# Patient Record
Sex: FEMALE | Race: OTHER | HISPANIC OR LATINO | Employment: FULL TIME | URBAN - METROPOLITAN AREA
[De-identification: names, ages, dates, MRNs, and addresses within clinical notes are randomized per-mention and may not be internally consistent; named-entity substitution may affect disease eponyms.]

---

## 2017-06-26 ENCOUNTER — HOSPITAL ENCOUNTER (EMERGENCY)
Facility: HOSPITAL | Age: 32
Discharge: HOME | End: 2017-06-26
Attending: EMERGENCY MEDICINE
Payer: COMMERCIAL

## 2017-06-26 VITALS
OXYGEN SATURATION: 99 % | SYSTOLIC BLOOD PRESSURE: 126 MMHG | HEART RATE: 62 BPM | DIASTOLIC BLOOD PRESSURE: 71 MMHG | RESPIRATION RATE: 16 BRPM | TEMPERATURE: 98.1 F | WEIGHT: 145 LBS

## 2017-06-26 DIAGNOSIS — R10.9 ABDOMINAL PAIN: Primary | ICD-10-CM

## 2017-06-26 DIAGNOSIS — R11.2 NAUSEA AND VOMITING: ICD-10-CM

## 2017-06-26 LAB
ALBUMIN SERPL BCP-MCNC: 4.2 G/DL (ref 3.5–5)
ALP SERPL-CCNC: 74 U/L (ref 46–116)
ALT SERPL W P-5'-P-CCNC: 65 U/L (ref 12–78)
ANION GAP SERPL CALCULATED.3IONS-SCNC: 6 MMOL/L (ref 4–13)
AST SERPL W P-5'-P-CCNC: 55 U/L (ref 5–45)
BASOPHILS # BLD AUTO: 0.02 THOUSANDS/ΜL (ref 0–0.1)
BASOPHILS NFR BLD AUTO: 0 % (ref 0–1)
BILIRUB SERPL-MCNC: 0.35 MG/DL (ref 0.2–1)
BILIRUB UR QL STRIP: NEGATIVE
BUN SERPL-MCNC: 9 MG/DL (ref 5–25)
CALCIUM SERPL-MCNC: 9.3 MG/DL (ref 8.3–10.1)
CHLORIDE SERPL-SCNC: 100 MMOL/L (ref 100–108)
CLARITY UR: CLEAR
CLARITY, POC: CLEAR
CO2 SERPL-SCNC: 31 MMOL/L (ref 21–32)
COLOR UR: YELLOW
COLOR, POC: YELLOW
CREAT SERPL-MCNC: 0.79 MG/DL (ref 0.6–1.3)
EOSINOPHIL # BLD AUTO: 0.13 THOUSAND/ΜL (ref 0–0.61)
EOSINOPHIL NFR BLD AUTO: 2 % (ref 0–6)
ERYTHROCYTE [DISTWIDTH] IN BLOOD BY AUTOMATED COUNT: 12.5 % (ref 11.6–15.1)
GFR SERPL CREATININE-BSD FRML MDRD: >60 ML/MIN/1.73SQ M
GLUCOSE SERPL-MCNC: 94 MG/DL (ref 65–140)
GLUCOSE UR STRIP-MCNC: NEGATIVE MG/DL
HCG UR QL: NEGATIVE
HCT VFR BLD AUTO: 38.5 % (ref 34.8–46.1)
HGB BLD-MCNC: 13.2 G/DL (ref 11.5–15.4)
HGB UR QL STRIP.AUTO: NEGATIVE
KETONES UR STRIP-MCNC: NEGATIVE MG/DL
LEUKOCYTE ESTERASE UR QL STRIP: NEGATIVE
LIPASE SERPL-CCNC: 101 U/L (ref 73–393)
LYMPHOCYTES # BLD AUTO: 2.36 THOUSANDS/ΜL (ref 0.6–4.47)
LYMPHOCYTES NFR BLD AUTO: 32 % (ref 14–44)
MCH RBC QN AUTO: 30.4 PG (ref 26.8–34.3)
MCHC RBC AUTO-ENTMCNC: 34.3 G/DL (ref 31.4–37.4)
MCV RBC AUTO: 89 FL (ref 82–98)
MONOCYTES # BLD AUTO: 0.54 THOUSAND/ΜL (ref 0.17–1.22)
MONOCYTES NFR BLD AUTO: 7 % (ref 4–12)
NEUTROPHILS # BLD AUTO: 4.28 THOUSANDS/ΜL (ref 1.85–7.62)
NEUTS SEG NFR BLD AUTO: 59 % (ref 43–75)
NITRITE UR QL STRIP: NEGATIVE
NRBC BLD AUTO-RTO: 0 /100 WBCS
PH UR STRIP.AUTO: 6.5 [PH] (ref 4.5–8)
PLATELET # BLD AUTO: 253 THOUSANDS/UL (ref 149–390)
PMV BLD AUTO: 10.5 FL (ref 8.9–12.7)
POTASSIUM SERPL-SCNC: 4.3 MMOL/L (ref 3.5–5.3)
PROT SERPL-MCNC: 8.2 G/DL (ref 6.4–8.2)
PROT UR STRIP-MCNC: NEGATIVE MG/DL
RBC # BLD AUTO: 4.34 MILLION/UL (ref 3.81–5.12)
SODIUM SERPL-SCNC: 137 MMOL/L (ref 136–145)
SP GR UR STRIP.AUTO: 1.02 (ref 1–1.03)
UROBILINOGEN UR QL STRIP.AUTO: 0.2 E.U./DL
WBC # BLD AUTO: 7.33 THOUSAND/UL (ref 4.31–10.16)

## 2017-06-26 PROCEDURE — 96374 THER/PROPH/DIAG INJ IV PUSH: CPT

## 2017-06-26 PROCEDURE — 81003 URINALYSIS AUTO W/O SCOPE: CPT

## 2017-06-26 PROCEDURE — 96361 HYDRATE IV INFUSION ADD-ON: CPT

## 2017-06-26 PROCEDURE — 96375 TX/PRO/DX INJ NEW DRUG ADDON: CPT

## 2017-06-26 PROCEDURE — 85025 COMPLETE CBC W/AUTO DIFF WBC: CPT | Performed by: EMERGENCY MEDICINE

## 2017-06-26 PROCEDURE — 36415 COLL VENOUS BLD VENIPUNCTURE: CPT | Performed by: EMERGENCY MEDICINE

## 2017-06-26 PROCEDURE — 83690 ASSAY OF LIPASE: CPT | Performed by: EMERGENCY MEDICINE

## 2017-06-26 PROCEDURE — 81002 URINALYSIS NONAUTO W/O SCOPE: CPT | Performed by: EMERGENCY MEDICINE

## 2017-06-26 PROCEDURE — 99283 EMERGENCY DEPT VISIT LOW MDM: CPT

## 2017-06-26 PROCEDURE — 80053 COMPREHEN METABOLIC PANEL: CPT | Performed by: EMERGENCY MEDICINE

## 2017-06-26 PROCEDURE — 81025 URINE PREGNANCY TEST: CPT | Performed by: EMERGENCY MEDICINE

## 2017-06-26 RX ORDER — KETOROLAC TROMETHAMINE 30 MG/ML
15 INJECTION, SOLUTION INTRAMUSCULAR; INTRAVENOUS ONCE
Status: COMPLETED | OUTPATIENT
Start: 2017-06-26 | End: 2017-06-26

## 2017-06-26 RX ORDER — FAMOTIDINE 20 MG/1
20 TABLET, FILM COATED ORAL
Qty: 30 TABLET | Refills: 0 | Status: SHIPPED | OUTPATIENT
Start: 2017-06-26

## 2017-06-26 RX ORDER — ONDANSETRON 4 MG/1
4 TABLET, FILM COATED ORAL EVERY 6 HOURS
Qty: 12 TABLET | Refills: 0 | Status: SHIPPED | OUTPATIENT
Start: 2017-06-26

## 2017-06-26 RX ORDER — ONDANSETRON 2 MG/ML
4 INJECTION INTRAMUSCULAR; INTRAVENOUS ONCE
Status: COMPLETED | OUTPATIENT
Start: 2017-06-26 | End: 2017-06-26

## 2017-06-26 RX ADMIN — SODIUM CHLORIDE 1000 ML: 0.9 INJECTION, SOLUTION INTRAVENOUS at 19:56

## 2017-06-26 RX ADMIN — KETOROLAC TROMETHAMINE 15 MG: 30 INJECTION, SOLUTION INTRAMUSCULAR at 19:58

## 2017-06-26 RX ADMIN — ONDANSETRON 4 MG: 2 INJECTION INTRAMUSCULAR; INTRAVENOUS at 19:56

## 2020-11-11 ENCOUNTER — TELEPHONE (OUTPATIENT)
Dept: UROLOGY | Facility: MEDICAL CENTER | Age: 35
End: 2020-11-11

## 2022-05-22 ENCOUNTER — HOSPITAL ENCOUNTER (EMERGENCY)
Facility: HOSPITAL | Age: 37
Discharge: HOME | End: 2022-05-22
Attending: EMERGENCY MEDICINE | Admitting: EMERGENCY MEDICINE
Payer: MEDICARE

## 2022-05-22 VITALS
RESPIRATION RATE: 15 BRPM | TEMPERATURE: 98.1 F | OXYGEN SATURATION: 100 % | HEART RATE: 80 BPM | DIASTOLIC BLOOD PRESSURE: 59 MMHG | SYSTOLIC BLOOD PRESSURE: 110 MMHG

## 2022-05-22 DIAGNOSIS — F41.9 ANXIETY: ICD-10-CM

## 2022-05-22 DIAGNOSIS — R11.2 NAUSEA AND VOMITING: Primary | ICD-10-CM

## 2022-05-22 LAB
ALBUMIN SERPL BCP-MCNC: 4.3 G/DL (ref 3.5–5)
ALP SERPL-CCNC: 61 U/L (ref 46–116)
ALT SERPL W P-5'-P-CCNC: 21 U/L (ref 12–78)
ANION GAP SERPL CALCULATED.3IONS-SCNC: 10 MMOL/L (ref 4–13)
AST SERPL W P-5'-P-CCNC: 23 U/L (ref 5–45)
BACTERIA UR QL AUTO: ABNORMAL /HPF
BASOPHILS # BLD AUTO: 0.04 THOUSANDS/ΜL (ref 0–0.1)
BASOPHILS NFR BLD AUTO: 1 % (ref 0–1)
BILIRUB SERPL-MCNC: 0.27 MG/DL (ref 0.2–1)
BILIRUB UR QL STRIP: NEGATIVE
BUN SERPL-MCNC: 9 MG/DL (ref 5–25)
CALCIUM SERPL-MCNC: 8.9 MG/DL (ref 8.3–10.1)
CHLORIDE SERPL-SCNC: 104 MMOL/L (ref 100–108)
CLARITY UR: ABNORMAL
CO2 SERPL-SCNC: 23 MMOL/L (ref 21–32)
COLOR UR: YELLOW
CREAT SERPL-MCNC: 0.72 MG/DL (ref 0.6–1.3)
EOSINOPHIL # BLD AUTO: 0.06 THOUSAND/ΜL (ref 0–0.61)
EOSINOPHIL NFR BLD AUTO: 1 % (ref 0–6)
ERYTHROCYTE [DISTWIDTH] IN BLOOD BY AUTOMATED COUNT: 11.7 % (ref 11.6–15.1)
EXT PREG TEST URINE: NORMAL
EXT. CONTROL ED NAV: NORMAL
GFR SERPL CREATININE-BSD FRML MDRD: 108 ML/MIN/1.73SQ M
GLUCOSE SERPL-MCNC: 84 MG/DL (ref 65–140)
GLUCOSE UR STRIP-MCNC: NEGATIVE MG/DL
HCT VFR BLD AUTO: 44 % (ref 34.8–46.1)
HGB BLD-MCNC: 14.6 G/DL (ref 11.5–15.4)
HGB UR QL STRIP.AUTO: NEGATIVE
IMM GRANULOCYTES # BLD AUTO: 0.02 THOUSAND/UL (ref 0–0.2)
IMM GRANULOCYTES NFR BLD AUTO: 0 % (ref 0–2)
KETONES UR STRIP-MCNC: NEGATIVE MG/DL
LEUKOCYTE ESTERASE UR QL STRIP: NEGATIVE
LYMPHOCYTES # BLD AUTO: 1.71 THOUSANDS/ΜL (ref 0.6–4.47)
LYMPHOCYTES NFR BLD AUTO: 28 % (ref 14–44)
MCH RBC QN AUTO: 30.4 PG (ref 26.8–34.3)
MCHC RBC AUTO-ENTMCNC: 33.2 G/DL (ref 31.4–37.4)
MCV RBC AUTO: 92 FL (ref 82–98)
MONOCYTES # BLD AUTO: 0.4 THOUSAND/ΜL (ref 0.17–1.22)
MONOCYTES NFR BLD AUTO: 7 % (ref 4–12)
NEUTROPHILS # BLD AUTO: 3.9 THOUSANDS/ΜL (ref 1.85–7.62)
NEUTS SEG NFR BLD AUTO: 63 % (ref 43–75)
NITRITE UR QL STRIP: NEGATIVE
NON-SQ EPI CELLS URNS QL MICRO: ABNORMAL /HPF
NRBC BLD AUTO-RTO: 0 /100 WBCS
PH UR STRIP.AUTO: 8.5 [PH] (ref 4.5–8)
PLATELET # BLD AUTO: 253 THOUSANDS/UL (ref 149–390)
PMV BLD AUTO: 10.6 FL (ref 8.9–12.7)
POTASSIUM SERPL-SCNC: 4.3 MMOL/L (ref 3.5–5.3)
PROT SERPL-MCNC: 8.3 G/DL (ref 6.4–8.2)
PROT UR STRIP-MCNC: ABNORMAL MG/DL
RBC # BLD AUTO: 4.8 MILLION/UL (ref 3.81–5.12)
RBC #/AREA URNS AUTO: ABNORMAL /HPF
SODIUM SERPL-SCNC: 137 MMOL/L (ref 136–145)
SP GR UR STRIP.AUTO: 1.01 (ref 1–1.03)
UROBILINOGEN UR QL STRIP.AUTO: 0.2 E.U./DL
WBC # BLD AUTO: 6.13 THOUSAND/UL (ref 4.31–10.16)
WBC #/AREA URNS AUTO: ABNORMAL /HPF

## 2022-05-22 PROCEDURE — 85025 COMPLETE CBC W/AUTO DIFF WBC: CPT | Performed by: PHYSICIAN ASSISTANT

## 2022-05-22 PROCEDURE — 81001 URINALYSIS AUTO W/SCOPE: CPT

## 2022-05-22 PROCEDURE — 96361 HYDRATE IV INFUSION ADD-ON: CPT

## 2022-05-22 PROCEDURE — 80053 COMPREHEN METABOLIC PANEL: CPT | Performed by: PHYSICIAN ASSISTANT

## 2022-05-22 PROCEDURE — 36415 COLL VENOUS BLD VENIPUNCTURE: CPT | Performed by: PHYSICIAN ASSISTANT

## 2022-05-22 PROCEDURE — 81025 URINE PREGNANCY TEST: CPT | Performed by: PHYSICIAN ASSISTANT

## 2022-05-22 PROCEDURE — 96375 TX/PRO/DX INJ NEW DRUG ADDON: CPT

## 2022-05-22 PROCEDURE — 99284 EMERGENCY DEPT VISIT MOD MDM: CPT | Performed by: PHYSICIAN ASSISTANT

## 2022-05-22 PROCEDURE — 99283 EMERGENCY DEPT VISIT LOW MDM: CPT

## 2022-05-22 PROCEDURE — 96374 THER/PROPH/DIAG INJ IV PUSH: CPT

## 2022-05-22 RX ORDER — FAMOTIDINE 20 MG/1
20 TABLET, FILM COATED ORAL 2 TIMES DAILY
Qty: 14 TABLET | Refills: 0 | Status: SHIPPED | OUTPATIENT
Start: 2022-05-22

## 2022-05-22 RX ORDER — ONDANSETRON 4 MG/1
4 TABLET, ORALLY DISINTEGRATING ORAL EVERY 6 HOURS PRN
Qty: 20 TABLET | Refills: 0 | Status: SHIPPED | OUTPATIENT
Start: 2022-05-22

## 2022-05-22 RX ORDER — LORAZEPAM 2 MG/ML
1 INJECTION INTRAMUSCULAR ONCE
Status: COMPLETED | OUTPATIENT
Start: 2022-05-22 | End: 2022-05-22

## 2022-05-22 RX ORDER — LORAZEPAM 0.5 MG/1
0.5 TABLET ORAL EVERY 6 HOURS PRN
Qty: 8 TABLET | Refills: 0 | Status: SHIPPED | OUTPATIENT
Start: 2022-05-22

## 2022-05-22 RX ORDER — ONDANSETRON 2 MG/ML
4 INJECTION INTRAMUSCULAR; INTRAVENOUS ONCE
Status: COMPLETED | OUTPATIENT
Start: 2022-05-22 | End: 2022-05-22

## 2022-05-22 RX ORDER — KETOROLAC TROMETHAMINE 30 MG/ML
15 INJECTION, SOLUTION INTRAMUSCULAR; INTRAVENOUS ONCE
Status: COMPLETED | OUTPATIENT
Start: 2022-05-22 | End: 2022-05-22

## 2022-05-22 RX ADMIN — ONDANSETRON 4 MG: 2 INJECTION INTRAMUSCULAR; INTRAVENOUS at 10:08

## 2022-05-22 RX ADMIN — KETOROLAC TROMETHAMINE 15 MG: 30 INJECTION, SOLUTION INTRAMUSCULAR; INTRAVENOUS at 10:25

## 2022-05-22 RX ADMIN — LORAZEPAM 1 MG: 2 INJECTION INTRAMUSCULAR; INTRAVENOUS at 10:25

## 2022-05-22 RX ADMIN — SODIUM CHLORIDE 1000 ML: 0.9 INJECTION, SOLUTION INTRAVENOUS at 10:08

## 2022-05-22 NOTE — ED PROVIDER NOTES
History  Chief Complaint   Patient presents with    Nausea     Pt reports that yesterday she had too much to drink, which she does not normally do  Today feels weak  C/o nausea, anxiety  Hx anxiety     Usha Dela Cruz is a 38 yo F presenting with nausea, lightheadedness, fatigue, headache, diarrhea, and some abdominal cramping which began this morning  She reports drinking approximately 6-8 drinks last night and reports she does not normally drink alcohol  She reports this morning she has felt very anxious and has been experiencing tingling in lips and fingertips/toes along with increased respirations due to anxiety  She reports she has xanax at home for anxiety but did not yet take that today  She took tylenol this morning without much relief of headache  Denies illicit substance use  History provided by:  Patient   used: No        Prior to Admission Medications   Prescriptions Last Dose Informant Patient Reported? Taking?   famotidine (PEPCID) 20 mg tablet 5/21/2022 at Unknown time  No Yes   Sig: Take 1 tablet by mouth daily at bedtime as needed for heartburn   ondansetron (ZOFRAN) 4 mg tablet Not Taking at Unknown time  No No   Sig: Take 1 tablet by mouth every 6 (six) hours   Patient not taking: Reported on 5/22/2022      Facility-Administered Medications: None       Past Medical History:   Diagnosis Date    Asthma        History reviewed  No pertinent surgical history  History reviewed  No pertinent family history  I have reviewed and agree with the history as documented  E-Cigarette/Vaping     E-Cigarette/Vaping Substances     Social History     Tobacco Use    Smoking status: Never Smoker    Smokeless tobacco: Never Used   Substance Use Topics    Alcohol use: No    Drug use: Yes     Types: Marijuana     Comment: has medical card       Review of Systems   Constitutional: Positive for fatigue  Negative for chills and fever     HENT: Negative for congestion, rhinorrhea and sore throat  Eyes: Negative for pain and visual disturbance  Respiratory: Negative for cough, shortness of breath and wheezing  Cardiovascular: Negative for chest pain and palpitations  Gastrointestinal: Positive for abdominal pain, diarrhea and nausea  Negative for constipation and vomiting  Genitourinary: Negative for dysuria, frequency and urgency  Musculoskeletal: Negative for back pain, neck pain and neck stiffness  Skin: Negative for rash and wound  Neurological: Positive for light-headedness and headaches  Negative for dizziness, weakness and numbness  Psychiatric/Behavioral: The patient is nervous/anxious  Physical Exam  Physical Exam  Constitutional:       General: She is not in acute distress  Appearance: She is well-developed  She is not diaphoretic  HENT:      Head: Normocephalic and atraumatic  Right Ear: External ear normal       Left Ear: External ear normal    Eyes:      Conjunctiva/sclera: Conjunctivae normal       Pupils: Pupils are equal, round, and reactive to light  Cardiovascular:      Rate and Rhythm: Normal rate and regular rhythm  Heart sounds: Normal heart sounds  No murmur heard  No friction rub  No gallop  Pulmonary:      Effort: Pulmonary effort is normal  No respiratory distress  Breath sounds: Normal breath sounds  No wheezing or rhonchi  Comments: Slight hyperventilation ([id number]) during exam  Abdominal:      General: There is no distension  Palpations: Abdomen is soft  Tenderness: There is no abdominal tenderness  There is no right CVA tenderness, left CVA tenderness or guarding  Musculoskeletal:      Cervical back: Normal range of motion and neck supple  Lymphadenopathy:      Cervical: No cervical adenopathy  Skin:     General: Skin is warm and dry  Capillary Refill: Capillary refill takes less than 2 seconds  Findings: No erythema or rash     Neurological:      Mental Status: She is alert and oriented to person, place, and time  Motor: No abnormal muscle tone  Coordination: Coordination normal    Psychiatric:         Mood and Affect: Mood is anxious  Behavior: Behavior normal          Thought Content:  Thought content normal          Judgment: Judgment normal          Vital Signs  ED Triage Vitals   Temperature Pulse Respirations Blood Pressure SpO2   05/22/22 0917 05/22/22 0917 05/22/22 0917 05/22/22 0917 05/22/22 0917   98 1 °F (36 7 °C) 78 18 118/74 100 %      Temp Source Heart Rate Source Patient Position - Orthostatic VS BP Location FiO2 (%)   05/22/22 0917 05/22/22 0917 -- -- --   Oral Monitor         Pain Score       05/22/22 1007       5           Vitals:    05/22/22 0917 05/22/22 1125   BP: 118/74 110/59   Pulse: 78 80         Visual Acuity      ED Medications  Medications   sodium chloride 0 9 % bolus 1,000 mL (0 mL Intravenous Stopped 5/22/22 1126)   ondansetron (ZOFRAN) injection 4 mg (4 mg Intravenous Given 5/22/22 1008)   ketorolac (TORADOL) injection 15 mg (15 mg Intravenous Given 5/22/22 1025)   LORazepam (ATIVAN) injection 1 mg (1 mg Intravenous Given 5/22/22 1025)       Diagnostic Studies  Results Reviewed     Procedure Component Value Units Date/Time    Urine Microscopic [[id number]]  (Abnormal) Collected: 05/22/22 1021    Lab Status: Final result Specimen: Urine, Clean Catch Updated: 05/22/22 1039     RBC, UA 0-1 /hpf      WBC, UA 0-1 /hpf      Epithelial Cells Occasional /hpf      Bacteria, UA None Seen /hpf     Comprehensive metabolic panel [[id number]]  (Abnormal) Collected: 05/22/22 1006    Lab Status: Final result Specimen: Blood from Arm, Right Updated: 05/22/22 1035     Sodium 137 mmol/L      Potassium 4 3 mmol/L      Chloride 104 mmol/L      CO2 23 mmol/L      ANION GAP 10 mmol/L      BUN 9 mg/dL      Creatinine 0 72 mg/dL      Glucose 84 mg/dL      Calcium 8 9 mg/dL      AST 23 U/L      ALT 21 U/L      Alkaline Phosphatase 61 U/L      Total Protein 8 3 g/dL      Albumin 4 3 g/dL      Total Bilirubin 0 27 mg/dL      eGFR 108 ml/min/1 73sq m     Narrative:      [place] guidelines for Chronic Kidney Disease (CKD):     Stage 1 with normal or high GFR (GFR > 90 mL/min/1 73 square meters)    Stage 2 Mild CKD (GFR = 60-89 mL/min/1 73 square meters)    Stage 3A Moderate CKD (GFR = 45-59 mL/min/1 73 square meters)    Stage 3B Moderate CKD (GFR = 30-44 mL/min/1 73 square meters)    Stage 4 Severe CKD (GFR = 15-29 mL/min/1 73 square meters)    Stage 5 End Stage CKD (GFR <15 mL/min/1 73 square meters)  Note: GFR calculation is accurate only with a steady state creatinine    POCT pregnancy, urine [[id number]]  (Normal) Resulted: 05/22/22 1024    Lab Status: Final result Updated: 05/22/22 1024     EXT PREG TEST UR (Ref: Negative) Negative (-)     Control Valid    Urine Macroscopic, POC [[id number]]  (Abnormal) Collected: 05/22/22 1021    Lab Status: Final result Specimen: Urine Updated: 05/22/22 1022     Color, UA Yellow     Clarity, UA Slightly Cloudy     pH, UA 8 5     Leukocytes, UA Negative     Nitrite, UA Negative     Protein, UA 30 (1+) mg/dl      Glucose, UA Negative mg/dl      Ketones, UA Negative mg/dl      Urobilinogen, UA 0 2 E U /dl      Bilirubin, UA Negative     Blood, UA Negative     Specific Gravity, UA 1 015    Narrative:      CLINITEK RESULT    CBC and differential [[id number]] Collected: 05/22/22 1006    Lab Status: Final result Specimen: Blood from Arm, Right Updated: 05/22/22 1021     WBC 6 13 Thousand/uL      RBC 4 80 Million/uL      Hemoglobin 14 6 g/dL      Hematocrit 44 0 %      MCV 92 fL      MCH 30 4 pg      MCHC 33 2 g/dL      RDW 11 7 %      MPV 10 6 fL      Platelets 447 Thousands/uL      nRBC 0 /100 WBCs      Neutrophils Relative 63 %      Immat GRANS % 0 %      Lymphocytes Relative 28 %      Monocytes Relative 7 %      Eosinophils Relative 1 %      Basophils Relative 1 %      Neutrophils Absolute 3 90 Thousands/µL      Immature Grans Absolute 0 02 Thousand/uL      Lymphocytes Absolute 1 71 Thousands/µL      Monocytes Absolute 0 40 Thousand/µL      Eosinophils Absolute 0 06 Thousand/µL      Basophils Absolute 0 04 Thousands/µL                  No orders to display              Procedures  Procedures         ED Course  ED Course as of 05/22/22 1230   Sun May 22, 2022   1146 Pt reports she has significant improvement in nausea, no further emesis during ED course  She no longer appears anxious and is resting comfortably  Notes anxiety is improved with ativan  She requests ativan at home - informed I cannot provide long term prescription but will provide brief course for the next several days as needed for anxiety  SBIRT 20yo+    Flowsheet Row Most Recent Value   SBIRT (23 yo +)    In order to provide better care to our patients, we are screening all of our patients for alcohol and drug use  Would it be okay to ask you these screening questions? No Filed at: 05/22/2022 5518                    [facility]  Number of Diagnoses or Management Options  Anxiety  Nausea and vomiting  Diagnosis management comments: Symptoms of excess alcohol consumption including nausea, diarrhea, headache, abdominal cramping as well as increased anxiety this morning  Pt appears anxious on exam with slight hyperventilation  She is otherwise well appearing and in no acute distress  Plan for IV fluid hydration, Zofran for nausea, ativan for anxiety  Will check labs including CBC, CMP for electrolytes, urine dip/preg  Disposition pending ED workup          Amount and/or Complexity of Data Reviewed  Clinical lab tests: ordered and reviewed    Patient Progress  Patient progress: improved      Disposition  Final diagnoses:   Nausea and vomiting   Anxiety     Time reflects when diagnosis was documented in both MDM as applicable and the Disposition within this note     Time User Action Codes Description Comment    5/22/2022 11:39 AM Marck Stephen [R11 2] Nausea and vomiting     5/22/2022 11:39 AM Elizabeth Soriano Add [F41 9] Anxiety       ED Disposition     ED Disposition   Discharge    Condition   Stable    Date/Time   Sun May 22, 2022 11:38 AM    Comment   Khalida Ricks discharge to home/self care  Follow-up Information     Follow up With Specialties Details Why [address] [facility] Emergency Department Emergency Medicine  If symptoms worsen [facility] [id number]  [address] [facility] Emergency Department, [address] , Romeo Kelsey MD Family Medicine Schedule an appointment as soon as possible for a visit   [address] [facility]  [phone]             Discharge Medication List as of 5/22/2022 11:43 AM      START taking these medications    Details   !! famotidine (PEPCID) 20 mg tablet Take 1 tablet (20 mg total) by mouth in the morning and 1 tablet (20 mg total) in the evening , Starting Sun 5/22/2022, Normal      LORazepam (Ativan) 0 5 mg tablet Take 1 tablet (0 5 mg total) by mouth every 6 (six) hours as needed for anxiety for up to 8 doses, Starting Sun 5/22/2022, Normal      ondansetron (Zofran ODT) 4 mg disintegrating tablet Take 1 tablet (4 mg total) by mouth every 6 (six) hours as needed for nausea or vomiting, Starting Sun 5/22/2022, Normal       !! - Potential duplicate medications found  Please discuss with provider  CONTINUE these medications which have NOT CHANGED    Details   !! famotidine (PEPCID) 20 mg tablet Take 1 tablet by mouth daily at bedtime as needed for heartburn, Starting Mon 6/26/2017, Print      ondansetron (ZOFRAN) 4 mg tablet Take 1 tablet by mouth every 6 (six) hours, Starting Mon 6/26/2017, Print       !! - Potential duplicate medications found  Please discuss with provider  No discharge procedures on file      PDMP Review     None          ED Provider  Electronically Signed by           Louisa Ayoub PA-C  05/22/22 6385

## 2022-05-22 NOTE — DISCHARGE INSTRUCTIONS
Please refer to the attached information for strict return instructions  If symptoms worsen or new symptoms develop please return to the ER  Drink plenty of fluids to stay hydrated

## 2025-04-30 ENCOUNTER — OFFICE VISIT (OUTPATIENT)
Dept: GASTROENTEROLOGY | Facility: CLINIC | Age: 40
End: 2025-04-30
Payer: COMMERCIAL

## 2025-04-30 VITALS
WEIGHT: 148 LBS | BODY MASS INDEX: 27.94 KG/M2 | TEMPERATURE: 98.1 F | DIASTOLIC BLOOD PRESSURE: 70 MMHG | HEIGHT: 61 IN | SYSTOLIC BLOOD PRESSURE: 120 MMHG

## 2025-04-30 DIAGNOSIS — R10.13 DYSPEPSIA: ICD-10-CM

## 2025-04-30 DIAGNOSIS — K59.04 CHRONIC IDIOPATHIC CONSTIPATION: Primary | ICD-10-CM

## 2025-04-30 DIAGNOSIS — R27.8 DYSSYNERGIA: ICD-10-CM

## 2025-04-30 DIAGNOSIS — N81.6 RECTOCELE: ICD-10-CM

## 2025-04-30 DIAGNOSIS — K59.04 CHRONIC IDIOPATHIC CONSTIPATION: ICD-10-CM

## 2025-04-30 DIAGNOSIS — E73.9 LACTOSE INTOLERANCE: ICD-10-CM

## 2025-04-30 PROCEDURE — 99244 OFF/OP CNSLTJ NEW/EST MOD 40: CPT | Performed by: INTERNAL MEDICINE

## 2025-04-30 RX ORDER — BISACODYL 5 MG/1
10 TABLET, DELAYED RELEASE ORAL ONCE
Qty: 4 TABLET | Refills: 0 | Status: SHIPPED | OUTPATIENT
Start: 2025-04-30 | End: 2025-04-30

## 2025-04-30 RX ORDER — MONTELUKAST SODIUM 10 MG/1
10 TABLET ORAL
COMMUNITY
Start: 2025-01-27 | End: 2026-01-27

## 2025-04-30 RX ORDER — SODIUM CHLORIDE, SODIUM LACTATE, POTASSIUM CHLORIDE, CALCIUM CHLORIDE 600; 310; 30; 20 MG/100ML; MG/100ML; MG/100ML; MG/100ML
125 INJECTION, SOLUTION INTRAVENOUS CONTINUOUS
OUTPATIENT
Start: 2025-04-30

## 2025-04-30 RX ORDER — MIRTAZAPINE 7.5 MG/1
7.5 TABLET, FILM COATED ORAL
COMMUNITY
Start: 2025-02-03

## 2025-04-30 RX ORDER — AMOXICILLIN 250 MG
1 CAPSULE ORAL DAILY
COMMUNITY
Start: 2025-04-22 | End: 2026-04-22

## 2025-04-30 RX ORDER — CITALOPRAM HYDROBROMIDE 40 MG/1
40 TABLET ORAL DAILY
COMMUNITY
Start: 2025-04-17 | End: 2026-04-17

## 2025-04-30 RX ORDER — ALPRAZOLAM 0.5 MG
0.5 TABLET ORAL 3 TIMES DAILY PRN
COMMUNITY
Start: 2025-04-17 | End: 2025-10-14

## 2025-04-30 RX ORDER — CETIRIZINE HYDROCHLORIDE 10 MG/1
1 TABLET ORAL 2 TIMES DAILY
COMMUNITY
Start: 2025-04-17

## 2025-04-30 RX ORDER — ALBUTEROL SULFATE 90 UG/1
INHALANT RESPIRATORY (INHALATION)
COMMUNITY

## 2025-04-30 RX ORDER — PROPRANOLOL HYDROCHLORIDE 60 MG/1
60 TABLET ORAL DAILY
COMMUNITY
Start: 2024-11-29 | End: 2025-11-29

## 2025-04-30 RX ORDER — RIZATRIPTAN BENZOATE 5 MG/1
5 TABLET ORAL
COMMUNITY
Start: 2024-11-29 | End: 2025-11-29

## 2025-04-30 RX ORDER — BUDESONIDE AND FORMOTEROL FUMARATE DIHYDRATE 160; 4.5 UG/1; UG/1
2 AEROSOL RESPIRATORY (INHALATION) 2 TIMES DAILY
COMMUNITY
Start: 2025-01-27

## 2025-04-30 NOTE — ASSESSMENT & PLAN NOTE
Orders:    Celiac Disease Comprehensive Panel; Future    TSH, 3rd generation with Free T4 reflex; Future    polyethylene glycol (GOLYTELY) 4000 mL solution; Take 4,000 mL by mouth once for 1 dose    polyethylene glycol (GOLYTELY) 4000 mL solution; Take 4,000 mL by mouth once for 1 dose    bisacodyl (DULCOLAX) 5 mg EC tablet; Take 2 tablets (10 mg total) by mouth once for 1 dose    Ambulatory Referral to Physical Therapy; Future    linaCLOtide 290 MCG CAPS; Take 1 capsule by mouth daily  - Symptoms suggest IBS-C and potential pelvic floor dysfunction in the rectal region.  - Previous imaging studies reveal extensive stool accumulation throughout the colon.  - Enhance symptom management and increase frequency of complete evacuation.  - Discussed potential benefits of biofeedback and pelvic physical therapy for constipation.  - Reassured condition is likely functional rather than malignant.  - Initiate two-day preparation for colonoscopy.  - Discontinue Linzess 48 hours prior to defecography and anal manometry.  - Order defecography, anal manometry, and colonoscopy with two-day preparation.  - Prescribe Linzess 290 mcg, to be taken on an empty stomach with MiraLAX and Dulcolax.  - Referral for pelvic physical therapy.

## 2025-04-30 NOTE — PATIENT INSTRUCTIONS
Scheduled date of EGD/colonoscopy (as of today):06.19.25  Physician performing EGD/colonoscopy:DR PEREZ  Location of EGD[facility]colonoscopy[facility]  Desired bowel prep reviewed with patient:2 DAY GOLKEVON  Instructions reviewed with patient by:AMANUEL  Clearances: N/A    ANAL MANOMETRY scheduled 05/09/25  Pt will schedule MRI

## 2025-04-30 NOTE — ASSESSMENT & PLAN NOTE
As above  Orders:    MRI defecography; Future    Basic metabolic panel; Future    Colonoscopy; Future    Anal manometry; Future    Ambulatory Referral to Physical Therapy; Future

## 2025-04-30 NOTE — ASSESSMENT & PLAN NOTE
May need referral to colorectal depending on size of rectocele this was reported by patient.  I suspect pelvic floor may be a major issue in this situation especially with bladder prolapse history requiring surgery.

## 2025-04-30 NOTE — H&P (VIEW-ONLY)
Name: Humaira Flynn      : 1985      MRN: [id number]  Encounter Provider: Jian Bailey MD  Encounter Date: 2025   Encounter department: [facility] [place]  :  Assessment & Plan  Chronic idiopathic constipation    Orders:    Celiac Disease Comprehensive Panel; Future    TSH, 3rd generation with Free T4 reflex; Future    polyethylene glycol (GOLYTELY) 4000 mL solution; Take 4,000 mL by mouth once for 1 dose    polyethylene glycol (GOLYTELY) 4000 mL solution; Take 4,000 mL by mouth once for 1 dose    bisacodyl (DULCOLAX) 5 mg EC tablet; Take 2 tablets (10 mg total) by mouth once for 1 dose    Ambulatory Referral to Physical Therapy; Future    linaCLOtide 290 MCG CAPS; Take 1 capsule by mouth daily  - Symptoms suggest IBS-C and potential pelvic floor dysfunction in the rectal region.  - Previous imaging studies reveal extensive stool accumulation throughout the colon.  - Enhance symptom management and increase frequency of complete evacuation.  - Discussed potential benefits of biofeedback and pelvic physical therapy for constipation.  - Reassured condition is likely functional rather than malignant.  - Initiate two-day preparation for colonoscopy.  - Discontinue Linzess 48 hours prior to defecography and anal manometry.  - Order defecography, anal manometry, and colonoscopy with two-day preparation.  - Prescribe Linzess 290 mcg, to be taken on an empty stomach with MiraLAX and Dulcolax.  - Referral for pelvic physical therapy.  Dyspepsia  Likely in setting of constipation will rule out other secondary causes  We will also plan for EGD and colonoscopy simultaneously  - Significant bloating may be secondary to constipation.  - Recommend low FODMAP diet to manage symptoms.  - Perform endoscopy concurrently with colonoscopy to rule out upper GI tract issues.  - Conduct tests for celiac disease and thyroid function.  Has history of lactose intolerance continue to avoid  dairy  Orders:    EGD; Future    Dyssynergia  As above  Orders:    MRI defecography; Future    Basic metabolic panel; Future    Colonoscopy; Future    Anal manometry; Future    Ambulatory Referral to Physical Therapy; Future    Rectocele  May need referral to colorectal depending on size of rectocele this was reported by patient.  I suspect pelvic floor may be a major issue in this situation especially with bladder prolapse history requiring surgery.       Lactose intolerance  Continue lactose-free diet           History of Present Illness     History of Present Illness  The patient is a 39-year-old female who presents for constipation and abdominal pain, complicated by blood in the stool.    She has been experiencing abdominal pain and discomfort for several years. A colonoscopy performed at [facility] in 2020 did not reveal any significant findings, leading to a tentative diagnosis of irritable bowel syndrome with constipation (IBS-C). Constipation has been managed with MiraLAX twice daily for over 12 to 15 years, but she reports diminishing effectiveness. Despite these interventions, persistent constipation is characterized by infrequent bowel movements every 7 to 10 days, often requiring the use of enemas or suppositories. Stools are typically thin, ribbon-like, pale, and rigid, occasionally accompanied by blood. She rarely experiences the urge to defecate and cannot recall her last normal bowel movement. Various treatments have been attempted, including Colace, Smooth Move tea, Mag O7, Metamucil, and Dulcolax. Linzess was prescribed following her colonoscopy, but it was found ineffective. During a follow-up appointment, she was informed that her symptoms might be due to pelvic floor issues.    A history of pelvic floor issues includes the placement of a mesh to support her bladder in 2020, which was removed a year later due to complications, leading to a recurrence of her symptoms. Her OB/GYN recently  "diagnosed her with a mild rectocele. Worsening constipation has led to the use of glycerin suppositories, large amounts of water, and incorporating dates and prunes into her diet. Some relief has been found using a squatting potty. A long-standing history of constipation dates back to childhood, progressively worsening over the years. She has three children, all delivered vaginally without episiotomy. An appendectomy was performed a few years ago.    Bloating and discomfort are reported after eating, even small amounts of food. Cheese and milk are avoided due to lactose intolerance, and lactose-free milk is consumed instead. An endoscopy has not been performed.    She is currently on antidepressants and does not take any pain medications.    PAST SURGICAL HISTORY: Appendectomy in 2016, mesh placement for bladder support in 2020, and subsequent mesh removal in 2021.    SOCIAL HISTORY  Diet: Eats dates and prunes, drinks a lot of water, avoids cheese and milk, drinks lactose-free milk.      Review of Systems   Constitutional: Negative.    HENT: Negative.     Eyes: Negative.    Respiratory: Negative.     Cardiovascular: Negative.    Gastrointestinal:  Positive for abdominal distention, abdominal pain, blood in stool and constipation. Negative for anal bleeding, diarrhea, nausea, rectal pain and vomiting.   Endocrine: Negative.    Musculoskeletal: Negative.    Skin: Negative.    Allergic/Immunologic: Negative.    Neurological: Negative.    Hematological: Negative.    Psychiatric/Behavioral: Negative.      A complete review of systems is negative other than that noted above in the HPI.         Objective   /70 (BP Location: Right arm, Patient Position: Sitting, Cuff Size: Standard)   Temp 98.1 °F (36.7 °C) (Tympanic Core)   Ht 5' 1\" (1.549 m)   Wt 67.1 kg (148 lb)   BMI 27.96 kg/m²     Physical Exam  HENT:      Head: Normocephalic and atraumatic.   Cardiovascular:      Rate and Rhythm: Normal rate and regular " rhythm.   Pulmonary:      Effort: Pulmonary effort is normal.      Breath sounds: Normal breath sounds.   Abdominal:      General: Bowel sounds are normal. There is no distension.      Palpations: Abdomen is soft.      Tenderness: There is generalized abdominal tenderness. There is no rebound.   Musculoskeletal:      Cervical back: Normal range of motion.   Skin:     General: Skin is warm.   Neurological:      Mental Status: She is oriented to person, place, and time.       Physical Exam  Heart: Cardiovascular exam within normal limits.  Lungs: Respiratory exam within normal limits.  Abdomen: Bowel sounds are scanty. The abdomen is tympanic on palpation, otherwise soft.    Results  Labs   - Complete Blood Count (CBC): Within normal limits   - Comprehensive Metabolic Panel (CMP): 2024, Within normal limits    Imaging   - CT scan: 2016, Nondilated bowel, prior appendectomy, formed stool in the colon rectum, no free fluid or air  Lab Results: I personally reviewed relevant lab results.

## 2025-04-30 NOTE — ASSESSMENT & PLAN NOTE
Likely in setting of constipation will rule out other secondary causes  We will also plan for EGD and colonoscopy simultaneously  - Significant bloating may be secondary to constipation.  - Recommend low FODMAP diet to manage symptoms.  - Perform endoscopy concurrently with colonoscopy to rule out upper GI tract issues.  - Conduct tests for celiac disease and thyroid function.  Has history of lactose intolerance continue to avoid dairy  Orders:    EGD; Future

## 2025-04-30 NOTE — PROGRESS NOTES
Name: Humaira Flynn      : 1985      MRN: [id number]  Encounter Provider: Jian Bailey MD  Encounter Date: 2025   Encounter department: [facility] [place]  :  Assessment & Plan  Chronic idiopathic constipation    Orders:    Celiac Disease Comprehensive Panel; Future    TSH, 3rd generation with Free T4 reflex; Future    polyethylene glycol (GOLYTELY) 4000 mL solution; Take 4,000 mL by mouth once for 1 dose    polyethylene glycol (GOLYTELY) 4000 mL solution; Take 4,000 mL by mouth once for 1 dose    bisacodyl (DULCOLAX) 5 mg EC tablet; Take 2 tablets (10 mg total) by mouth once for 1 dose    Ambulatory Referral to Physical Therapy; Future    linaCLOtide 290 MCG CAPS; Take 1 capsule by mouth daily  - Symptoms suggest IBS-C and potential pelvic floor dysfunction in the rectal region.  - Previous imaging studies reveal extensive stool accumulation throughout the colon.  - Enhance symptom management and increase frequency of complete evacuation.  - Discussed potential benefits of biofeedback and pelvic physical therapy for constipation.  - Reassured condition is likely functional rather than malignant.  - Initiate two-day preparation for colonoscopy.  - Discontinue Linzess 48 hours prior to defecography and anal manometry.  - Order defecography, anal manometry, and colonoscopy with two-day preparation.  - Prescribe Linzess 290 mcg, to be taken on an empty stomach with MiraLAX and Dulcolax.  - Referral for pelvic physical therapy.  Dyspepsia  Likely in setting of constipation will rule out other secondary causes  We will also plan for EGD and colonoscopy simultaneously  - Significant bloating may be secondary to constipation.  - Recommend low FODMAP diet to manage symptoms.  - Perform endoscopy concurrently with colonoscopy to rule out upper GI tract issues.  - Conduct tests for celiac disease and thyroid function.  Has history of lactose intolerance continue to avoid  dairy  Orders:    EGD; Future    Dyssynergia  As above  Orders:    MRI defecography; Future    Basic metabolic panel; Future    Colonoscopy; Future    Anal manometry; Future    Ambulatory Referral to Physical Therapy; Future    Rectocele  May need referral to colorectal depending on size of rectocele this was reported by patient.  I suspect pelvic floor may be a major issue in this situation especially with bladder prolapse history requiring surgery.       Lactose intolerance  Continue lactose-free diet           History of Present Illness     History of Present Illness  The patient is a 39-year-old female who presents for constipation and abdominal pain, complicated by blood in the stool.    She has been experiencing abdominal pain and discomfort for several years. A colonoscopy performed at [facility] in 2020 did not reveal any significant findings, leading to a tentative diagnosis of irritable bowel syndrome with constipation (IBS-C). Constipation has been managed with MiraLAX twice daily for over 12 to 15 years, but she reports diminishing effectiveness. Despite these interventions, persistent constipation is characterized by infrequent bowel movements every 7 to 10 days, often requiring the use of enemas or suppositories. Stools are typically thin, ribbon-like, pale, and rigid, occasionally accompanied by blood. She rarely experiences the urge to defecate and cannot recall her last normal bowel movement. Various treatments have been attempted, including Colace, Smooth Move tea, Mag O7, Metamucil, and Dulcolax. Linzess was prescribed following her colonoscopy, but it was found ineffective. During a follow-up appointment, she was informed that her symptoms might be due to pelvic floor issues.    A history of pelvic floor issues includes the placement of a mesh to support her bladder in 2020, which was removed a year later due to complications, leading to a recurrence of her symptoms. Her OB/GYN recently  "diagnosed her with a mild rectocele. Worsening constipation has led to the use of glycerin suppositories, large amounts of water, and incorporating dates and prunes into her diet. Some relief has been found using a squatting potty. A long-standing history of constipation dates back to childhood, progressively worsening over the years. She has three children, all delivered vaginally without episiotomy. An appendectomy was performed a few years ago.    Bloating and discomfort are reported after eating, even small amounts of food. Cheese and milk are avoided due to lactose intolerance, and lactose-free milk is consumed instead. An endoscopy has not been performed.    She is currently on antidepressants and does not take any pain medications.    PAST SURGICAL HISTORY: Appendectomy in 2016, mesh placement for bladder support in 2020, and subsequent mesh removal in 2021.    SOCIAL HISTORY  Diet: Eats dates and prunes, drinks a lot of water, avoids cheese and milk, drinks lactose-free milk.      Review of Systems   Constitutional: Negative.    HENT: Negative.     Eyes: Negative.    Respiratory: Negative.     Cardiovascular: Negative.    Gastrointestinal:  Positive for abdominal distention, abdominal pain, blood in stool and constipation. Negative for anal bleeding, diarrhea, nausea, rectal pain and vomiting.   Endocrine: Negative.    Musculoskeletal: Negative.    Skin: Negative.    Allergic/Immunologic: Negative.    Neurological: Negative.    Hematological: Negative.    Psychiatric/Behavioral: Negative.      A complete review of systems is negative other than that noted above in the HPI.         Objective   /70 (BP Location: Right arm, Patient Position: Sitting, Cuff Size: Standard)   Temp 98.1 °F (36.7 °C) (Tympanic Core)   Ht 5' 1\" (1.549 m)   Wt 67.1 kg (148 lb)   BMI 27.96 kg/m²     Physical Exam  HENT:      Head: Normocephalic and atraumatic.   Cardiovascular:      Rate and Rhythm: Normal rate and regular " rhythm.   Pulmonary:      Effort: Pulmonary effort is normal.      Breath sounds: Normal breath sounds.   Abdominal:      General: Bowel sounds are normal. There is no distension.      Palpations: Abdomen is soft.      Tenderness: There is generalized abdominal tenderness. There is no rebound.   Musculoskeletal:      Cervical back: Normal range of motion.   Skin:     General: Skin is warm.   Neurological:      Mental Status: She is oriented to person, place, and time.       Physical Exam  Heart: Cardiovascular exam within normal limits.  Lungs: Respiratory exam within normal limits.  Abdomen: Bowel sounds are scanty. The abdomen is tympanic on palpation, otherwise soft.    Results  Labs   - Complete Blood Count (CBC): Within normal limits   - Comprehensive Metabolic Panel (CMP): 2024, Within normal limits    Imaging   - CT scan: 2016, Nondilated bowel, prior appendectomy, formed stool in the colon rectum, no free fluid or air  Lab Results: I personally reviewed relevant lab results.

## 2025-04-30 NOTE — TELEPHONE ENCOUNTER
Reason for call:   [x] Refill   [] Prior Auth  [] Other:     Office:   [] PCP/Provider -   [x] Specialty/Provider - gastro    Medication: polyethylene glycol (GOLYTELY) 4000 mL solution Take 4,000 mL by mouth once for 1 dose,       Pharmacy: [facility] #[id number] - [place] - [address]      [facility]   Does the patient have enough for 3 days?   [x] Yes   [] No - Send as HP to POD    Mail Away Pharmacy   Does the patient have enough for 10 days?   [] Yes   [] No - Send as HP to POD

## 2025-05-01 ENCOUNTER — TELEPHONE (OUTPATIENT)
Dept: GASTROENTEROLOGY | Facility: HOSPITAL | Age: 40
End: 2025-05-01

## 2025-05-08 ENCOUNTER — TELEPHONE (OUTPATIENT)
Dept: GASTROENTEROLOGY | Facility: HOSPITAL | Age: 40
End: 2025-05-08

## 2025-05-14 ENCOUNTER — TRANSCRIBE ORDERS (OUTPATIENT)
Dept: GASTROENTEROLOGY | Facility: CLINIC | Age: 40
End: 2025-05-14

## 2025-05-14 ENCOUNTER — HOSPITAL ENCOUNTER (OUTPATIENT)
Dept: MRI IMAGING | Facility: HOSPITAL | Age: 40
Discharge: HOME | End: 2025-05-14
Attending: INTERNAL MEDICINE
Payer: COMMERCIAL

## 2025-05-14 DIAGNOSIS — R27.8 DYSSYNERGIA: ICD-10-CM

## 2025-05-14 PROCEDURE — 72195 MRI PELVIS W/O DYE: CPT

## 2025-05-20 DIAGNOSIS — N81.6 RECTOCELE: ICD-10-CM

## 2025-05-20 DIAGNOSIS — K59.04 CHRONIC IDIOPATHIC CONSTIPATION: ICD-10-CM

## 2025-05-20 DIAGNOSIS — R27.8 DYSSYNERGIA: Primary | ICD-10-CM

## 2025-05-20 NOTE — TELEPHONE ENCOUNTER
Patients GI provider:  Dr. Bailey    Number to return call: [phone]    Reason for call: A patient called in to see if Dr. Bailey has any sooner appointments available for an EGD/Colonoscopy. As of now, the earliest he has is the scheduled date of 06/19/25. She mentioned that he would call her if there is a way to get her scheduled sooner; however, if not, would he suggest that she schedule with a different provider in the office who has availability? She would like a call back to advise on this.      Scheduled procedure/appointment date if applicable: Procedure: 06/19/25

## 2025-05-20 NOTE — TELEPHONE ENCOUNTER
Spoke with pt.  No sooner appt with Dr. Bailey is currently available.  I will keep an eye on his schedule and let her know if procedure appts come available.

## 2025-05-20 NOTE — TELEPHONE ENCOUNTER
Pt calling to r/s procedure for an earlier date, she is scheduled for the first avail with Dr. Bailey.  Left procedure scheduled as is.

## 2025-05-21 ENCOUNTER — TELEPHONE (OUTPATIENT)
Dept: GASTROENTEROLOGY | Facility: CLINIC | Age: 40
End: 2025-05-21

## 2025-05-23 NOTE — PROGRESS NOTES
Name: Humaira Flynn      : 1985      MRN: [id number]  Encounter Provider: Venita Willett MD  Encounter Date: 2025   Encounter department: [facility] [place]  :  Assessment & Plan           History of Present Illness {?Quick Links Encounters * My Last Note * Last Note in Specialty * Snapshot * Since Last Visit * History :55344}  HPI    Humaira Flynn presents referred by Dr. Jian Bailey for dyssynergia; chronic idiopathic constipation; rectocele.     Defecography on 2025: 1.  Anatomic findings: Normal.  2.  Anterior compartment findings: Mild cystocele with urethral hypermobility. 3.  Middle compartment findings: No significant uterine prolapse. 4.  Levator hiatus and anorectal junction/perineal descent findings: Slightly widened levator hiatus and low-lying anorectal junction at rest with excessive widening and excessive descent of the anorectal junction/perineum during defecation/maximal Valsalva. 5.  Posterior compartment findings: Moderate anterior rectocele. Paradoxical narrowing of the anorectal angle during attempted defecation.    Future Tests:  Anal manometry scheduled for 2025.   Colonoscopy/EGD scheduled for 2025 with Dr. Bailey.     Last colonoscopy performed on 3/5/2020 at [facility].     Review of Systems    Objective {?Quick Links Trend Vitals * Enter New Vitals * Results Review * Timeline (Adult) * Labs * Imaging * Cardiology * Procedures * Lung Cancer Screening * Surgical eConsent :06308}    There were no vitals taken for this visit.     Physical Exam  {Administrative / Billing Section (Optional):45721}

## 2025-05-27 ENCOUNTER — CONSULT (OUTPATIENT)
Age: 40
End: 2025-05-27
Attending: INTERNAL MEDICINE
Payer: COMMERCIAL

## 2025-05-27 VITALS — HEIGHT: 61 IN | BODY MASS INDEX: 27.94 KG/M2 | WEIGHT: 148 LBS

## 2025-05-27 DIAGNOSIS — N81.6 RECTOCELE: ICD-10-CM

## 2025-05-27 DIAGNOSIS — R27.8 DYSSYNERGIA: Primary | ICD-10-CM

## 2025-05-27 DIAGNOSIS — K59.04 CHRONIC IDIOPATHIC CONSTIPATION: ICD-10-CM

## 2025-05-27 PROCEDURE — 46600 DIAGNOSTIC ANOSCOPY SPX: CPT | Performed by: SURGERY

## 2025-05-27 PROCEDURE — 99203 OFFICE O/P NEW LOW 30 MIN: CPT | Performed by: SURGERY

## 2025-05-29 ENCOUNTER — ANESTHESIA (OUTPATIENT)
Dept: GASTROENTEROLOGY | Facility: HOSPITAL | Age: 40
End: 2025-05-29
Payer: COMMERCIAL

## 2025-05-29 ENCOUNTER — ANESTHESIA EVENT (OUTPATIENT)
Dept: GASTROENTEROLOGY | Facility: HOSPITAL | Age: 40
End: 2025-05-29
Payer: COMMERCIAL

## 2025-05-29 ENCOUNTER — HOSPITAL ENCOUNTER (OUTPATIENT)
Dept: GASTROENTEROLOGY | Facility: HOSPITAL | Age: 40
Setting detail: OUTPATIENT SURGERY
Discharge: HOME | End: 2025-05-29
Attending: INTERNAL MEDICINE
Payer: COMMERCIAL

## 2025-05-29 VITALS
HEART RATE: 48 BPM | SYSTOLIC BLOOD PRESSURE: 104 MMHG | HEIGHT: 61 IN | BODY MASS INDEX: 27.94 KG/M2 | RESPIRATION RATE: 18 BRPM | WEIGHT: 148 LBS | DIASTOLIC BLOOD PRESSURE: 58 MMHG | OXYGEN SATURATION: 100 % | TEMPERATURE: 97.1 F

## 2025-05-29 DIAGNOSIS — R10.13 DYSPEPSIA: ICD-10-CM

## 2025-05-29 DIAGNOSIS — R27.8 DYSSYNERGIA: ICD-10-CM

## 2025-05-29 PROBLEM — J45.909 ASTHMA: Status: ACTIVE | Noted: 2025-05-29

## 2025-05-29 PROCEDURE — 45378 DIAGNOSTIC COLONOSCOPY: CPT | Performed by: INTERNAL MEDICINE

## 2025-05-29 PROCEDURE — 88305 TISSUE EXAM BY PATHOLOGIST: CPT | Performed by: SPECIALIST

## 2025-05-29 PROCEDURE — 43239 EGD BIOPSY SINGLE/MULTIPLE: CPT | Performed by: INTERNAL MEDICINE

## 2025-05-29 PROCEDURE — 88342 IMHCHEM/IMCYTCHM 1ST ANTB: CPT | Performed by: SPECIALIST

## 2025-05-29 RX ORDER — LIDOCAINE HYDROCHLORIDE 20 MG/ML
INJECTION, SOLUTION EPIDURAL; INFILTRATION; INTRACAUDAL; PERINEURAL AS NEEDED
Status: DISCONTINUED | OUTPATIENT
Start: 2025-05-29 | End: 2025-05-29

## 2025-05-29 RX ORDER — PROPOFOL 10 MG/ML
INJECTION, EMULSION INTRAVENOUS AS NEEDED
Status: DISCONTINUED | OUTPATIENT
Start: 2025-05-29 | End: 2025-05-29

## 2025-05-29 RX ORDER — SODIUM CHLORIDE 9 MG/ML
INJECTION, SOLUTION INTRAVENOUS CONTINUOUS PRN
Status: DISCONTINUED | OUTPATIENT
Start: 2025-05-29 | End: 2025-05-29

## 2025-05-29 RX ORDER — PHENYLEPHRINE HCL IN 0.9% NACL 1 MG/10 ML
SYRINGE (ML) INTRAVENOUS AS NEEDED
Status: DISCONTINUED | OUTPATIENT
Start: 2025-05-29 | End: 2025-05-29

## 2025-05-29 RX ADMIN — PROPOFOL 100 MG: 10 INJECTION, EMULSION INTRAVENOUS at 09:57

## 2025-05-29 RX ADMIN — SODIUM CHLORIDE: 0.9 INJECTION, SOLUTION INTRAVENOUS at 09:50

## 2025-05-29 RX ADMIN — PROPOFOL 120 MCG/KG/MIN: 10 INJECTION, EMULSION INTRAVENOUS at 09:58

## 2025-05-29 RX ADMIN — LIDOCAINE HYDROCHLORIDE 100 MG: 20 INJECTION, SOLUTION EPIDURAL; INFILTRATION; INTRACAUDAL; PERINEURAL at 09:57

## 2025-05-29 RX ADMIN — Medication 100 MCG: at 09:59

## 2025-05-29 NOTE — INTERVAL H&P NOTE
H&P reviewed. After examining the patient I find no changes in the patients condition since the H&P had been written.    Vitals:    05/29/25 0857   BP: 106/63   Pulse: (!) 51   Resp: 16   Temp: (!) 97.1 °F (36.2 °C)   SpO2: 100%

## 2025-05-29 NOTE — ANESTHESIA PREPROCEDURE EVALUATION
Procedure:  COLONOSCOPY  EGD    Relevant Problems   ANESTHESIA   (-) History of anesthesia complications      CARDIO   (+) Migraine without aura and without status migrainosus, not intractable   (-) Chest pain   (-) AREVALO (dyspnea on exertion)      NEURO/PSYCH   (+) Generalized anxiety disorder   (+) Migraine without aura and without status migrainosus, not intractable   (+) Moderate episode of recurrent major depressive disorder (HCC)      PULMONARY   (+) Asthma   (-) Shortness of breath   (-) Sleep apnea   (-) URI (upper respiratory infection)        Physical Exam    Airway     Mallampati score: II  TM Distance: >3 FB  Neck ROM: full      Cardiovascular      Dental       Pulmonary      Neurological      Other Findings  post-pubertal.      Anesthesia Plan  ASA Score- 2     Anesthesia Type- IV sedation with anesthesia with ASA Monitors.         Additional Monitors:     Airway Plan: natural airway.           Plan Factors-Exercise tolerance (METS): >4 METS.    Chart reviewed. EKG reviewed.  Existing labs reviewed. Patient summary reviewed.                  Induction- intravenous.    Postoperative Plan- .   Monitoring Plan - Monitoring plan - standard ASA monitoring          Informed Consent- Anesthetic plan and risks discussed with patient.  I personally reviewed this patient with the CRNA. Discussed and agreed on the Anesthesia Plan with the CRNA..      NPO Status:  Vitals Value Taken Time   Date of last liquid 05/28/25 05/29/25 08:42   Time of last liquid 2200 05/29/25 08:42   Date of last solid 05/27/25 05/29/25 08:42   Time of last solid 2000 05/29/25 08:42

## 2025-05-29 NOTE — ANESTHESIA POSTPROCEDURE EVALUATION
Post-Op Assessment Note    CV Status:  Stable  Pain Score: 0    Pain management: adequate       Mental Status:  Awake   Hydration Status:  Stable   PONV Controlled:  None   Airway Patency:  Patent     Post Op Vitals Reviewed: Yes    No anethesia notable event occurred.            Last Filed PACU Vitals:  Vitals Value Taken Time   Temp 97.1 °F (36.2 °C) 05/29/25 10:25   Pulse 83 05/29/25 10:25   /63 05/29/25 10:25   Resp 18 05/29/25 10:25   SpO2 99 % 05/29/25 10:25       Modified Parker:     Vitals Value Taken Time   Activity 2 05/29/25 10:25   Respiration 2 05/29/25 10:25   Circulation 2 05/29/25 10:25   Consciousness 2 05/29/25 10:25   Oxygen Saturation 2 05/29/25 10:25     Modified Parker Score: 10

## 2025-06-04 ENCOUNTER — RESULTS FOLLOW-UP (OUTPATIENT)
Dept: GASTROENTEROLOGY | Facility: CLINIC | Age: 40
End: 2025-06-04

## 2025-06-04 PROCEDURE — 88305 TISSUE EXAM BY PATHOLOGIST: CPT | Performed by: SPECIALIST

## 2025-06-04 PROCEDURE — 88342 IMHCHEM/IMCYTCHM 1ST ANTB: CPT | Performed by: SPECIALIST

## 2025-06-06 ENCOUNTER — TELEPHONE (OUTPATIENT)
Age: 40
End: 2025-06-06

## 2025-06-06 NOTE — TELEPHONE ENCOUNTER
Patient calling to cancel her anal manometry today and is unable to reschedule at this time. Call made to the manometry department to inform.

## 2025-06-09 NOTE — TELEPHONE ENCOUNTER
Called and spoke to  our  at [facility] and they will fax over her results and look into why they didn't come over electronically.

## 2025-06-10 ENCOUNTER — TRANSCRIBE ORDERS (OUTPATIENT)
Dept: GASTROENTEROLOGY | Facility: CLINIC | Age: 40
End: 2025-06-10

## 2025-06-12 ENCOUNTER — TELEPHONE (OUTPATIENT)
Age: 40
End: 2025-06-12

## 2025-06-12 NOTE — TELEPHONE ENCOUNTER
Calling back regarding results.  Advised patient would have provider review.  Realized he had and called patient back to notify that labs looked great.

## 2025-07-10 ENCOUNTER — TELEPHONE (OUTPATIENT)
Dept: GASTROENTEROLOGY | Facility: CLINIC | Age: 40
End: 2025-07-10

## 2025-07-10 NOTE — TELEPHONE ENCOUNTER
I called & lvm for the patient that we need to reschedule the appointment with Dr. Bailey on 8/26/25 due to a change in schedule. I asked the patient to call our office back to be rescheduled.